# Patient Record
Sex: FEMALE | Race: OTHER | NOT HISPANIC OR LATINO
[De-identification: names, ages, dates, MRNs, and addresses within clinical notes are randomized per-mention and may not be internally consistent; named-entity substitution may affect disease eponyms.]

---
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
## 2025-02-03 NOTE — BH DISCHARGE NOTE NURSING/SOCIAL WORK/PSYCH REHAB - NSBHDCREFERRAL1_PSY_ALL_CORE
Patient has history of GERD and epigastric pain and was seen by fellow, Dr. Couch in March 2023.  At that time patient was recommended to start Prilosec 40 mg daily and had EGD and colonoscopy scheduled.  EGD was completely normal with small bowel and gastric biopsies showing no significant abnormalities.  She is doing relatively well since that time however, it appears that she returned to eating poorly and ran out of her Protonix and had recurrent symptoms.  She states that it was significant enough that she had to go to the emergency department in October at which time abdominal imaging showed no acute abnormalities and her abdomen.  She was prescribed Protonix 20 mg daily however she took 40 mg daily and had a complete resolution of symptoms.  At this time, it has been several months since she has had symptoms and I recommend she decrease the dose of Protonix to 20 mg daily.  She is aware that if she has recurrence of GERD or epigastric pain over the next few weeks to increase the dose back up to 40 mg daily and let us know.  She does not require repeat EGD at this time as she has no alarm symptoms, and no breakthrough symptoms.    Orders:    pantoprazole (PROTONIX) 20 mg tablet; Take 1 tablet (20 mg total) by mouth daily    
Aftercare Appointment

## 2025-08-07 ENCOUNTER — INPATIENT (INPATIENT)
Facility: HOSPITAL | Age: 20
LOS: 6 days | Discharge: HOME | End: 2025-08-14
Attending: STUDENT IN AN ORGANIZED HEALTH CARE EDUCATION/TRAINING PROGRAM | Admitting: STUDENT IN AN ORGANIZED HEALTH CARE EDUCATION/TRAINING PROGRAM
Payer: COMMERCIAL

## 2025-08-07 VITALS
DIASTOLIC BLOOD PRESSURE: 72 MMHG | SYSTOLIC BLOOD PRESSURE: 105 MMHG | HEART RATE: 91 BPM | OXYGEN SATURATION: 99 % | TEMPERATURE: 98 F | WEIGHT: 130.07 LBS | RESPIRATION RATE: 18 BRPM

## 2025-08-07 PROCEDURE — 99285 EMERGENCY DEPT VISIT HI MDM: CPT

## 2025-08-08 DIAGNOSIS — R41.89 OTHER SYMPTOMS AND SIGNS INVOLVING COGNITIVE FUNCTIONS AND AWARENESS: ICD-10-CM

## 2025-08-08 LAB
ALBUMIN SERPL ELPH-MCNC: 4.4 G/DL — SIGNIFICANT CHANGE UP (ref 3.3–5)
ALP SERPL-CCNC: 62 U/L — SIGNIFICANT CHANGE UP (ref 40–120)
ALT FLD-CCNC: 11 U/L — SIGNIFICANT CHANGE UP (ref 4–33)
AMPHET UR-MCNC: NEGATIVE — SIGNIFICANT CHANGE UP
ANION GAP SERPL CALC-SCNC: 12 MMOL/L — SIGNIFICANT CHANGE UP (ref 7–14)
APAP SERPL-MCNC: <10 UG/ML — LOW (ref 15–25)
APPEARANCE UR: CLEAR — SIGNIFICANT CHANGE UP
AST SERPL-CCNC: 19 U/L — SIGNIFICANT CHANGE UP (ref 4–32)
BACTERIA # UR AUTO: NEGATIVE /HPF — SIGNIFICANT CHANGE UP
BARBITURATES UR SCN-MCNC: NEGATIVE — SIGNIFICANT CHANGE UP
BASOPHILS # BLD AUTO: 0.08 K/UL — SIGNIFICANT CHANGE UP (ref 0–0.2)
BASOPHILS NFR BLD AUTO: 0.8 % — SIGNIFICANT CHANGE UP (ref 0–2)
BENZODIAZ UR-MCNC: NEGATIVE — SIGNIFICANT CHANGE UP
BILIRUB SERPL-MCNC: 0.3 MG/DL — SIGNIFICANT CHANGE UP (ref 0.2–1.2)
BILIRUB UR-MCNC: NEGATIVE — SIGNIFICANT CHANGE UP
BUN SERPL-MCNC: 10 MG/DL — SIGNIFICANT CHANGE UP (ref 7–23)
CALCIUM SERPL-MCNC: 9.5 MG/DL — SIGNIFICANT CHANGE UP (ref 8.4–10.5)
CAST: 0 /LPF — SIGNIFICANT CHANGE UP (ref 0–4)
CHLORIDE SERPL-SCNC: 103 MMOL/L — SIGNIFICANT CHANGE UP (ref 98–107)
CO2 SERPL-SCNC: 22 MMOL/L — SIGNIFICANT CHANGE UP (ref 22–31)
COCAINE METAB.OTHER UR-MCNC: NEGATIVE — SIGNIFICANT CHANGE UP
COLOR SPEC: YELLOW — SIGNIFICANT CHANGE UP
CREAT SERPL-MCNC: 0.59 MG/DL — SIGNIFICANT CHANGE UP (ref 0.5–1.3)
CREATININE URINE RESULT, DAU: 164 MG/DL — SIGNIFICANT CHANGE UP
DIFF PNL FLD: NEGATIVE — SIGNIFICANT CHANGE UP
EGFR: 133 ML/MIN/1.73M2 — SIGNIFICANT CHANGE UP
EGFR: 133 ML/MIN/1.73M2 — SIGNIFICANT CHANGE UP
EOSINOPHIL # BLD AUTO: 0.25 K/UL — SIGNIFICANT CHANGE UP (ref 0–0.5)
EOSINOPHIL NFR BLD AUTO: 2.4 % — SIGNIFICANT CHANGE UP (ref 0–6)
ETHANOL SERPL-MCNC: <10 MG/DL — SIGNIFICANT CHANGE UP
FENTANYL UR QL SCN: NEGATIVE — SIGNIFICANT CHANGE UP
GLUCOSE SERPL-MCNC: 146 MG/DL — HIGH (ref 70–99)
GLUCOSE UR QL: NEGATIVE MG/DL — SIGNIFICANT CHANGE UP
HCG SERPL-ACNC: <1 MIU/ML — SIGNIFICANT CHANGE UP
HCT VFR BLD CALC: 40.1 % — SIGNIFICANT CHANGE UP (ref 34.5–45)
HGB BLD-MCNC: 12.8 G/DL — SIGNIFICANT CHANGE UP (ref 11.5–15.5)
IMM GRANULOCYTES # BLD AUTO: 0.04 K/UL — SIGNIFICANT CHANGE UP (ref 0–0.07)
IMM GRANULOCYTES NFR BLD AUTO: 0.4 % — SIGNIFICANT CHANGE UP (ref 0–0.9)
KETONES UR QL: NEGATIVE MG/DL — SIGNIFICANT CHANGE UP
LEUKOCYTE ESTERASE UR-ACNC: ABNORMAL
LYMPHOCYTES # BLD AUTO: 2.6 K/UL — SIGNIFICANT CHANGE UP (ref 1–3.3)
LYMPHOCYTES NFR BLD AUTO: 25.3 % — SIGNIFICANT CHANGE UP (ref 13–44)
MCHC RBC-ENTMCNC: 28 PG — SIGNIFICANT CHANGE UP (ref 27–34)
MCHC RBC-ENTMCNC: 31.9 G/DL — LOW (ref 32–36)
MCV RBC AUTO: 87.7 FL — SIGNIFICANT CHANGE UP (ref 80–100)
METHADONE UR-MCNC: NEGATIVE — SIGNIFICANT CHANGE UP
MONOCYTES # BLD AUTO: 0.55 K/UL — SIGNIFICANT CHANGE UP (ref 0–0.9)
MONOCYTES NFR BLD AUTO: 5.4 % — SIGNIFICANT CHANGE UP (ref 2–14)
NEUTROPHILS # BLD AUTO: 6.76 K/UL — SIGNIFICANT CHANGE UP (ref 1.8–7.4)
NEUTROPHILS NFR BLD AUTO: 65.7 % — SIGNIFICANT CHANGE UP (ref 43–77)
NITRITE UR-MCNC: NEGATIVE — SIGNIFICANT CHANGE UP
NRBC # BLD AUTO: 0 K/UL — SIGNIFICANT CHANGE UP (ref 0–0)
NRBC # FLD: 0 K/UL — SIGNIFICANT CHANGE UP (ref 0–0)
NRBC BLD AUTO-RTO: 0 /100 WBCS — SIGNIFICANT CHANGE UP (ref 0–0)
OPIATES UR-MCNC: NEGATIVE — SIGNIFICANT CHANGE UP
OXYCODONE UR-MCNC: NEGATIVE — SIGNIFICANT CHANGE UP
PCP SPEC-MCNC: SIGNIFICANT CHANGE UP
PCP UR-MCNC: NEGATIVE — SIGNIFICANT CHANGE UP
PH UR: 6 — SIGNIFICANT CHANGE UP (ref 5–8)
PLATELET # BLD AUTO: 249 K/UL — SIGNIFICANT CHANGE UP (ref 150–400)
PMV BLD: 10.8 FL — SIGNIFICANT CHANGE UP (ref 7–13)
POTASSIUM SERPL-MCNC: 4 MMOL/L — SIGNIFICANT CHANGE UP (ref 3.5–5.3)
POTASSIUM SERPL-SCNC: 4 MMOL/L — SIGNIFICANT CHANGE UP (ref 3.5–5.3)
PROT SERPL-MCNC: 7.7 G/DL — SIGNIFICANT CHANGE UP (ref 6–8.3)
PROT UR-MCNC: NEGATIVE MG/DL — SIGNIFICANT CHANGE UP
RBC # BLD: 4.57 M/UL — SIGNIFICANT CHANGE UP (ref 3.8–5.2)
RBC # FLD: 13.2 % — SIGNIFICANT CHANGE UP (ref 10.3–14.5)
RBC CASTS # UR COMP ASSIST: 1 /HPF — SIGNIFICANT CHANGE UP (ref 0–4)
SALICYLATES SERPL-MCNC: <0.3 MG/DL — LOW (ref 15–30)
SODIUM SERPL-SCNC: 137 MMOL/L — SIGNIFICANT CHANGE UP (ref 135–145)
SP GR SPEC: 1.03 — SIGNIFICANT CHANGE UP (ref 1–1.03)
SQUAMOUS # UR AUTO: 12 /HPF — HIGH (ref 0–5)
THC UR QL: POSITIVE
TOXICOLOGY SCREEN, DRUGS OF ABUSE, SERUM RESULT: SIGNIFICANT CHANGE UP
TSH SERPL-MCNC: 1.16 UIU/ML — SIGNIFICANT CHANGE UP (ref 0.5–4.3)
UROBILINOGEN FLD QL: 0.2 MG/DL — SIGNIFICANT CHANGE UP (ref 0.2–1)
WBC # BLD: 10.28 K/UL — SIGNIFICANT CHANGE UP (ref 3.8–10.5)
WBC # FLD AUTO: 10.28 K/UL — SIGNIFICANT CHANGE UP (ref 3.8–10.5)
WBC UR QL: 6 /HPF — HIGH (ref 0–5)

## 2025-08-08 PROCEDURE — 99285 EMERGENCY DEPT VISIT HI MDM: CPT

## 2025-08-08 PROCEDURE — 90853 GROUP PSYCHOTHERAPY: CPT

## 2025-08-08 RX ORDER — LORAZEPAM 4 MG/ML
2 VIAL (ML) INJECTION ONCE
Refills: 0 | Status: DISCONTINUED | OUTPATIENT
Start: 2025-08-08 | End: 2025-08-08

## 2025-08-08 RX ORDER — DIPHENHYDRAMINE HCL 12.5MG/5ML
25 ELIXIR ORAL ONCE
Refills: 0 | Status: COMPLETED | OUTPATIENT
Start: 2025-08-08 | End: 2025-08-08

## 2025-08-08 RX ORDER — HYDROXYZINE HYDROCHLORIDE 25 MG/1
25 TABLET, FILM COATED ORAL EVERY 6 HOURS
Refills: 0 | Status: DISCONTINUED | OUTPATIENT
Start: 2025-08-08 | End: 2025-08-08

## 2025-08-08 RX ORDER — NICOTINE POLACRILEX 4 MG/1
2 GUM, CHEWING ORAL ONCE
Refills: 0 | Status: COMPLETED | OUTPATIENT
Start: 2025-08-08 | End: 2025-08-08

## 2025-08-08 RX ORDER — ALBUTEROL SULFATE 2.5 MG/3ML
2 VIAL, NEBULIZER (ML) INHALATION EVERY 6 HOURS
Refills: 0 | Status: DISCONTINUED | OUTPATIENT
Start: 2025-08-08 | End: 2025-08-14

## 2025-08-08 RX ORDER — ALBUTEROL SULFATE 2.5 MG/3ML
2 VIAL, NEBULIZER (ML) INHALATION EVERY 6 HOURS
Refills: 0 | Status: DISCONTINUED | OUTPATIENT
Start: 2025-08-08 | End: 2025-08-08

## 2025-08-08 RX ORDER — NICOTINE POLACRILEX 4 MG/1
2 GUM, CHEWING ORAL ONCE
Refills: 0 | Status: COMPLETED | OUTPATIENT
Start: 2025-08-08 | End: 2025-08-09

## 2025-08-08 RX ORDER — MIDAZOLAM IN 0.9 % SOD.CHLORID 1 MG/ML
5 PLASTIC BAG, INJECTION (ML) INTRAVENOUS ONCE
Refills: 0 | Status: DISCONTINUED | OUTPATIENT
Start: 2025-08-08 | End: 2025-08-14

## 2025-08-08 RX ORDER — LUMATEPERONE 21 MG/1
1 CAPSULE ORAL
Qty: 30 | Refills: 0
Start: 2025-08-08 | End: 2025-09-06

## 2025-08-08 RX ORDER — OXCARBAZEPINE 150 MG/1
150 TABLET, FILM COATED ORAL
Refills: 0 | Status: DISCONTINUED | OUTPATIENT
Start: 2025-08-08 | End: 2025-08-11

## 2025-08-08 RX ORDER — NICOTINE POLACRILEX 4 MG/1
2 GUM, CHEWING ORAL
Refills: 0 | Status: DISCONTINUED | OUTPATIENT
Start: 2025-08-08 | End: 2025-08-08

## 2025-08-08 RX ORDER — NICOTINE POLACRILEX 4 MG/1
2 GUM, CHEWING ORAL
Refills: 0 | Status: DISCONTINUED | OUTPATIENT
Start: 2025-08-08 | End: 2025-08-12

## 2025-08-08 RX ORDER — HYDROXYZINE HYDROCHLORIDE 25 MG/1
25 TABLET, FILM COATED ORAL EVERY 6 HOURS
Refills: 0 | Status: DISCONTINUED | OUTPATIENT
Start: 2025-08-08 | End: 2025-08-09

## 2025-08-08 RX ORDER — LORAZEPAM 4 MG/ML
2 VIAL (ML) INJECTION EVERY 6 HOURS
Refills: 0 | Status: DISCONTINUED | OUTPATIENT
Start: 2025-08-08 | End: 2025-08-08

## 2025-08-08 RX ADMIN — NICOTINE POLACRILEX 2 MILLIGRAM(S): 4 GUM, CHEWING ORAL at 20:36

## 2025-08-08 RX ADMIN — NICOTINE POLACRILEX 2 MILLIGRAM(S): 4 GUM, CHEWING ORAL at 00:56

## 2025-08-08 RX ADMIN — OXCARBAZEPINE 150 MILLIGRAM(S): 150 TABLET, FILM COATED ORAL at 20:36

## 2025-08-08 RX ADMIN — Medication 25 MILLIGRAM(S): at 20:47

## 2025-08-08 RX ADMIN — Medication 2 MILLIGRAM(S): at 20:47

## 2025-08-09 PROCEDURE — 99232 SBSQ HOSP IP/OBS MODERATE 35: CPT

## 2025-08-09 RX ORDER — HYDROXYZINE HYDROCHLORIDE 25 MG/1
50 TABLET, FILM COATED ORAL EVERY 6 HOURS
Refills: 0 | Status: DISCONTINUED | OUTPATIENT
Start: 2025-08-09 | End: 2025-08-14

## 2025-08-09 RX ORDER — LORAZEPAM 4 MG/ML
2 VIAL (ML) INJECTION EVERY 6 HOURS
Refills: 0 | Status: DISCONTINUED | OUTPATIENT
Start: 2025-08-09 | End: 2025-08-14

## 2025-08-09 RX ORDER — DIPHENHYDRAMINE HCL 12.5MG/5ML
50 ELIXIR ORAL ONCE
Refills: 0 | Status: COMPLETED | OUTPATIENT
Start: 2025-08-09 | End: 2025-08-09

## 2025-08-09 RX ORDER — HYDROXYZINE HYDROCHLORIDE 25 MG/1
50 TABLET, FILM COATED ORAL ONCE
Refills: 0 | Status: COMPLETED | OUTPATIENT
Start: 2025-08-09 | End: 2025-08-09

## 2025-08-09 RX ADMIN — HYDROXYZINE HYDROCHLORIDE 50 MILLIGRAM(S): 25 TABLET, FILM COATED ORAL at 15:31

## 2025-08-09 RX ADMIN — HYDROXYZINE HYDROCHLORIDE 50 MILLIGRAM(S): 25 TABLET, FILM COATED ORAL at 23:52

## 2025-08-09 RX ADMIN — NICOTINE POLACRILEX 2 MILLIGRAM(S): 4 GUM, CHEWING ORAL at 15:31

## 2025-08-09 RX ADMIN — Medication 1 DOSE(S): at 11:30

## 2025-08-09 RX ADMIN — Medication 2 MILLIGRAM(S): at 19:51

## 2025-08-09 RX ADMIN — NICOTINE POLACRILEX 2 MILLIGRAM(S): 4 GUM, CHEWING ORAL at 20:04

## 2025-08-09 RX ADMIN — Medication 40 MILLIGRAM(S): at 11:29

## 2025-08-09 RX ADMIN — Medication 50 MILLIGRAM(S): at 23:52

## 2025-08-09 RX ADMIN — OXCARBAZEPINE 150 MILLIGRAM(S): 150 TABLET, FILM COATED ORAL at 11:29

## 2025-08-09 RX ADMIN — HYDROXYZINE HYDROCHLORIDE 50 MILLIGRAM(S): 25 TABLET, FILM COATED ORAL at 20:04

## 2025-08-09 RX ADMIN — Medication 5 MILLIGRAM(S): at 11:29

## 2025-08-10 PROCEDURE — 99231 SBSQ HOSP IP/OBS SF/LOW 25: CPT

## 2025-08-10 RX ORDER — HYDROCORTISONE 10 MG/G
1 CREAM TOPICAL
Refills: 0 | Status: DISCONTINUED | OUTPATIENT
Start: 2025-08-10 | End: 2025-08-14

## 2025-08-10 RX ADMIN — HYDROCORTISONE 1 APPLICATION(S): 10 CREAM TOPICAL at 15:40

## 2025-08-10 RX ADMIN — Medication 40 MILLIGRAM(S): at 08:17

## 2025-08-10 RX ADMIN — NICOTINE POLACRILEX 2 MILLIGRAM(S): 4 GUM, CHEWING ORAL at 18:34

## 2025-08-10 RX ADMIN — Medication 5 MILLIGRAM(S): at 08:17

## 2025-08-10 RX ADMIN — HYDROCORTISONE 1 APPLICATION(S): 10 CREAM TOPICAL at 21:40

## 2025-08-10 RX ADMIN — HYDROXYZINE HYDROCHLORIDE 50 MILLIGRAM(S): 25 TABLET, FILM COATED ORAL at 21:40

## 2025-08-10 RX ADMIN — HYDROXYZINE HYDROCHLORIDE 50 MILLIGRAM(S): 25 TABLET, FILM COATED ORAL at 12:53

## 2025-08-10 RX ADMIN — Medication 1 DOSE(S): at 08:16

## 2025-08-10 RX ADMIN — Medication 2 MILLIGRAM(S): at 09:01

## 2025-08-10 RX ADMIN — Medication 2 MILLIGRAM(S): at 18:33

## 2025-08-11 PROCEDURE — 99232 SBSQ HOSP IP/OBS MODERATE 35: CPT

## 2025-08-11 RX ORDER — MELATONIN 5 MG
5 TABLET ORAL ONCE
Refills: 0 | Status: COMPLETED | OUTPATIENT
Start: 2025-08-11 | End: 2025-08-11

## 2025-08-11 RX ORDER — LORAZEPAM 4 MG/ML
1 VIAL (ML) INJECTION ONCE
Refills: 0 | Status: DISCONTINUED | OUTPATIENT
Start: 2025-08-11 | End: 2025-08-11

## 2025-08-11 RX ORDER — MELATONIN 5 MG
5 TABLET ORAL AT BEDTIME
Refills: 0 | Status: DISCONTINUED | OUTPATIENT
Start: 2025-08-11 | End: 2025-08-14

## 2025-08-11 RX ADMIN — Medication 5 MILLIGRAM(S): at 02:17

## 2025-08-11 RX ADMIN — HYDROXYZINE HYDROCHLORIDE 50 MILLIGRAM(S): 25 TABLET, FILM COATED ORAL at 13:46

## 2025-08-11 RX ADMIN — HYDROXYZINE HYDROCHLORIDE 50 MILLIGRAM(S): 25 TABLET, FILM COATED ORAL at 19:53

## 2025-08-11 RX ADMIN — NICOTINE POLACRILEX 2 MILLIGRAM(S): 4 GUM, CHEWING ORAL at 20:30

## 2025-08-11 RX ADMIN — Medication 2 MILLIGRAM(S): at 01:20

## 2025-08-11 RX ADMIN — Medication 5 MILLIGRAM(S): at 09:14

## 2025-08-11 RX ADMIN — Medication 40 MILLIGRAM(S): at 09:14

## 2025-08-11 RX ADMIN — NICOTINE POLACRILEX 2 MILLIGRAM(S): 4 GUM, CHEWING ORAL at 23:29

## 2025-08-11 RX ADMIN — Medication 1 MILLIGRAM(S): at 17:59

## 2025-08-11 RX ADMIN — NICOTINE POLACRILEX 2 MILLIGRAM(S): 4 GUM, CHEWING ORAL at 15:10

## 2025-08-12 PROCEDURE — 99232 SBSQ HOSP IP/OBS MODERATE 35: CPT

## 2025-08-12 PROCEDURE — 90853 GROUP PSYCHOTHERAPY: CPT

## 2025-08-12 RX ORDER — BUPROPION HYDROBROMIDE 522 MG/1
150 TABLET, EXTENDED RELEASE ORAL DAILY
Refills: 0 | Status: DISCONTINUED | OUTPATIENT
Start: 2025-08-12 | End: 2025-08-14

## 2025-08-12 RX ORDER — NICOTINE POLACRILEX 4 MG/1
4 GUM, CHEWING ORAL
Refills: 0 | Status: DISCONTINUED | OUTPATIENT
Start: 2025-08-12 | End: 2025-08-14

## 2025-08-12 RX ORDER — LORAZEPAM 4 MG/ML
1 VIAL (ML) INJECTION EVERY 6 HOURS
Refills: 0 | Status: DISCONTINUED | OUTPATIENT
Start: 2025-08-12 | End: 2025-08-13

## 2025-08-12 RX ADMIN — Medication 1 MILLIGRAM(S): at 13:04

## 2025-08-12 RX ADMIN — HYDROXYZINE HYDROCHLORIDE 50 MILLIGRAM(S): 25 TABLET, FILM COATED ORAL at 10:46

## 2025-08-12 RX ADMIN — Medication 1 DOSE(S): at 13:04

## 2025-08-12 RX ADMIN — Medication 40 MILLIGRAM(S): at 08:18

## 2025-08-12 RX ADMIN — Medication 5 MILLIGRAM(S): at 02:55

## 2025-08-12 RX ADMIN — Medication 1 MILLIGRAM(S): at 21:47

## 2025-08-12 RX ADMIN — NICOTINE POLACRILEX 2 MILLIGRAM(S): 4 GUM, CHEWING ORAL at 10:46

## 2025-08-12 RX ADMIN — Medication 5 MILLIGRAM(S): at 08:18

## 2025-08-13 PROCEDURE — 99232 SBSQ HOSP IP/OBS MODERATE 35: CPT

## 2025-08-13 PROCEDURE — 99223 1ST HOSP IP/OBS HIGH 75: CPT

## 2025-08-13 RX ORDER — ACETAMINOPHEN 500 MG/5ML
650 LIQUID (ML) ORAL EVERY 6 HOURS
Refills: 0 | Status: DISCONTINUED | OUTPATIENT
Start: 2025-08-13 | End: 2025-08-14

## 2025-08-13 RX ORDER — ALBUTEROL SULFATE 2.5 MG/3ML
2 VIAL, NEBULIZER (ML) INHALATION
Qty: 0 | Refills: 0 | DISCHARGE
Start: 2025-08-13

## 2025-08-13 RX ORDER — LORAZEPAM 4 MG/ML
0.5 VIAL (ML) INJECTION EVERY 6 HOURS
Refills: 0 | Status: DISCONTINUED | OUTPATIENT
Start: 2025-08-13 | End: 2025-08-14

## 2025-08-13 RX ORDER — NICOTINE POLACRILEX 4 MG/1
1 GUM, CHEWING ORAL
Qty: 1 | Refills: 0
Start: 2025-08-13 | End: 2025-08-26

## 2025-08-13 RX ORDER — BUPROPION HYDROBROMIDE 522 MG/1
1 TABLET, EXTENDED RELEASE ORAL
Qty: 15 | Refills: 0
Start: 2025-08-13 | End: 2025-08-27

## 2025-08-13 RX ADMIN — BUPROPION HYDROBROMIDE 150 MILLIGRAM(S): 522 TABLET, EXTENDED RELEASE ORAL at 11:06

## 2025-08-13 RX ADMIN — Medication 5 MILLIGRAM(S): at 11:07

## 2025-08-13 RX ADMIN — Medication 650 MILLIGRAM(S): at 05:47

## 2025-08-13 RX ADMIN — Medication 650 MILLIGRAM(S): at 06:38

## 2025-08-13 RX ADMIN — Medication 2 MILLIGRAM(S): at 04:55

## 2025-08-13 RX ADMIN — Medication 40 MILLIGRAM(S): at 11:07

## 2025-08-13 RX ADMIN — Medication 5 MILLIGRAM(S): at 00:01

## 2025-08-13 RX ADMIN — HYDROXYZINE HYDROCHLORIDE 50 MILLIGRAM(S): 25 TABLET, FILM COATED ORAL at 20:05

## 2025-08-13 RX ADMIN — Medication 650 MILLIGRAM(S): at 12:26

## 2025-08-14 VITALS — TEMPERATURE: 98 F

## 2025-08-14 RX ADMIN — BUPROPION HYDROBROMIDE 150 MILLIGRAM(S): 522 TABLET, EXTENDED RELEASE ORAL at 09:29

## 2025-08-14 RX ADMIN — Medication 40 MILLIGRAM(S): at 07:59

## 2025-08-14 RX ADMIN — Medication 5 MILLIGRAM(S): at 09:30

## 2025-08-14 RX ADMIN — Medication 650 MILLIGRAM(S): at 09:30

## 2025-08-14 RX ADMIN — Medication 1 DOSE(S): at 09:29

## 2025-08-14 RX ADMIN — Medication 2 MILLIGRAM(S): at 00:06
